# Patient Record
Sex: FEMALE | Race: ASIAN | NOT HISPANIC OR LATINO | Employment: UNEMPLOYED
[De-identification: names, ages, dates, MRNs, and addresses within clinical notes are randomized per-mention and may not be internally consistent; named-entity substitution may affect disease eponyms.]

---

## 2016-04-19 LAB
HPV_EXT - HISTORICAL: NORMAL
PAP SMEAR - HIM PATIENT REPORTED: NORMAL

## 2017-02-14 ENCOUNTER — RECORDS - HEALTHEAST (OUTPATIENT)
Dept: ADMINISTRATIVE | Facility: OTHER | Age: 42
End: 2017-02-14

## 2017-06-15 ENCOUNTER — HOSPITAL ENCOUNTER (OUTPATIENT)
Dept: MAMMOGRAPHY | Facility: HOSPITAL | Age: 42
Discharge: HOME | End: 2017-06-15
Attending: SPECIALIST

## 2017-06-15 DIAGNOSIS — Z12.31 VISIT FOR SCREENING MAMMOGRAM: ICD-10-CM

## 2018-01-30 ENCOUNTER — OFFICE VISIT - HEALTHEAST (OUTPATIENT)
Dept: FAMILY MEDICINE | Facility: CLINIC | Age: 43
End: 2018-01-30

## 2018-01-30 DIAGNOSIS — L70.9 ACNE: ICD-10-CM

## 2018-01-30 ASSESSMENT — MIFFLIN-ST. JEOR: SCORE: 958.3

## 2018-04-18 ENCOUNTER — TELEPHONE (OUTPATIENT)
Dept: OTHER | Facility: CLINIC | Age: 43
End: 2018-04-18

## 2018-04-18 NOTE — TELEPHONE ENCOUNTER
4/18/2018    Call Regarding Onboarding [facility] Other    Attempt 1    Message voicemail not set-up    Comments: 2 adult dep, 1 child dep      Outreach   VILMA

## 2018-05-24 ENCOUNTER — COMMUNICATION - HEALTHEAST (OUTPATIENT)
Dept: FAMILY MEDICINE | Facility: CLINIC | Age: 43
End: 2018-05-24

## 2018-05-24 DIAGNOSIS — L70.9 ACNE: ICD-10-CM

## 2018-05-25 RX ORDER — MINOCYCLINE HYDROCHLORIDE 100 MG/1
CAPSULE ORAL
Qty: 90 CAPSULE | Refills: 0 | Status: SHIPPED | OUTPATIENT
Start: 2018-05-25 | End: 2022-05-19

## 2019-05-14 ENCOUNTER — RECORDS - HEALTHEAST (OUTPATIENT)
Dept: ADMINISTRATIVE | Facility: OTHER | Age: 44
End: 2019-05-14

## 2019-05-30 ENCOUNTER — OFFICE VISIT - HEALTHEAST (OUTPATIENT)
Dept: FAMILY MEDICINE | Facility: CLINIC | Age: 44
End: 2019-05-30

## 2019-05-30 DIAGNOSIS — Z30.9 ENCOUNTER FOR CONTRACEPTIVE MANAGEMENT, UNSPECIFIED TYPE: ICD-10-CM

## 2019-05-30 DIAGNOSIS — L70.9 ACNE, UNSPECIFIED ACNE TYPE: ICD-10-CM

## 2019-05-30 LAB
ALBUMIN SERPL-MCNC: 3.4 G/DL (ref 3.5–5)
ALP SERPL-CCNC: 57 U/L (ref 45–120)
ALT SERPL W P-5'-P-CCNC: 16 U/L (ref 0–45)
AST SERPL W P-5'-P-CCNC: 15 U/L (ref 0–40)
BASOPHILS # BLD AUTO: 0 THOU/UL (ref 0–0.2)
BASOPHILS NFR BLD AUTO: 0 % (ref 0–2)
BILIRUB DIRECT SERPL-MCNC: 0.2 MG/DL
BILIRUB SERPL-MCNC: 0.5 MG/DL (ref 0–1)
CHOLEST SERPL-MCNC: 176 MG/DL
EOSINOPHIL # BLD AUTO: 0.2 THOU/UL (ref 0–0.4)
EOSINOPHIL NFR BLD AUTO: 3 % (ref 0–6)
ERYTHROCYTE [DISTWIDTH] IN BLOOD BY AUTOMATED COUNT: 15.5 % (ref 11–14.5)
FASTING STATUS PATIENT QL REPORTED: NO
HCT VFR BLD AUTO: 33.5 % (ref 35–47)
HDLC SERPL-MCNC: 49 MG/DL
HGB BLD-MCNC: 11.3 G/DL (ref 12–16)
LDLC SERPL CALC-MCNC: 104 MG/DL
LYMPHOCYTES # BLD AUTO: 1.9 THOU/UL (ref 0.8–4.4)
LYMPHOCYTES NFR BLD AUTO: 31 % (ref 20–40)
MCH RBC QN AUTO: 26.3 PG (ref 27–34)
MCHC RBC AUTO-ENTMCNC: 33.6 G/DL (ref 32–36)
MCV RBC AUTO: 78 FL (ref 80–100)
MONOCYTES # BLD AUTO: 0.4 THOU/UL (ref 0–0.9)
MONOCYTES NFR BLD AUTO: 6 % (ref 2–10)
NEUTROPHILS # BLD AUTO: 3.7 THOU/UL (ref 2–7.7)
NEUTROPHILS NFR BLD AUTO: 60 % (ref 50–70)
PLATELET # BLD AUTO: 222 THOU/UL (ref 140–440)
PMV BLD AUTO: 8.8 FL (ref 7–10)
PROT SERPL-MCNC: 6.5 G/DL (ref 6–8)
RBC # BLD AUTO: 4.28 MILL/UL (ref 3.8–5.4)
TRIGL SERPL-MCNC: 117 MG/DL
WBC: 6.2 THOU/UL (ref 4–11)

## 2019-05-30 RX ORDER — DOXYCYCLINE 100 MG/1
CAPSULE ORAL
Refills: 0 | Status: SHIPPED | COMMUNITY
Start: 2019-05-14 | End: 2022-05-19

## 2019-05-30 RX ORDER — LEVONORGESTREL/ETHIN.ESTRADIOL 0.1-0.02MG
1 TABLET ORAL DAILY
Qty: 90 TABLET | Refills: 3 | Status: SHIPPED | OUTPATIENT
Start: 2019-05-30 | End: 2022-05-19

## 2019-06-25 ENCOUNTER — HOSPITAL ENCOUNTER (OUTPATIENT)
Dept: MAMMOGRAPHY | Facility: CLINIC | Age: 44
Discharge: HOME | End: 2019-06-25
Attending: FAMILY MEDICINE

## 2019-06-25 DIAGNOSIS — Z12.31 VISIT FOR SCREENING MAMMOGRAM: ICD-10-CM

## 2019-07-03 ENCOUNTER — COMMUNICATION - HEALTHEAST (OUTPATIENT)
Dept: FAMILY MEDICINE | Facility: CLINIC | Age: 44
End: 2019-07-03

## 2020-03-22 ENCOUNTER — VIRTUAL VISIT (OUTPATIENT)
Dept: FAMILY MEDICINE | Facility: OTHER | Age: 45
End: 2020-03-22

## 2020-03-23 NOTE — PROGRESS NOTES
"Date: 2020 18:03:10  Clinician: Myesha Frederick  Clinician NPI: [id number]  Patient: Camilla Johnson  Patient : 1975  Patient Address: [address], [place] [address]  Patient Phone: [phone]  Visit Protocol: TAPAN  Patient Summary:  Camilla is a 44 year old ( : 1975 ) female who initiated a Visit for COVID-19 (Coronavirus) evaluation and screening. When asked the question \"Please sign me up to receive news, health information and promotions. \", Camilla responded \"No\".    Camilla states her symptoms started 1-2 days ago.   Her symptoms consist of a headache. She is experiencing mild difficulty breathing with activities but can speak normally in full sentences.   Symptom details   Headache: She states the headache is mild (1-3 on a 10 point pain scale).    Camilla denies having ear pain, rhinitis, facial pain or pressure, myalgias, wheezing, sore throat, cough, nasal congestion, malaise, chills, teeth pain, and fever. She also denies taking antibiotic medication for the symptoms and having recent facial or sinus surgery in the past 60 days.    Pertinent COVID-19 (Coronavirus) information  Camilla has not traveled internationally or to the areas where COVID-19 (Coronavirus) is widespread, including cruise ship travel in the last 14 days before the start of her symptoms.   Camilla has not had a close contact with a laboratory-confirmed COVID-19 patient within 14 days of symptom onset. She also has not had a close contact with a suspected COVID-19 patient within 14 days of symptom onset.   Camilla is not a healthcare worker and does not work in a healthcare facility.   Pertinent medical history  Camilla does not get yeast infections when she takes antibiotics.   Camilla needs a return to work/school note.   Weight: 105 lbs   Camilla does not smoke or use smokeless tobacco.   She denies pregnancy and denies breastfeeding. She is currently menstruating.   Weight: 105 lbs    MEDICATIONS: No current medications, ALLERGIES: NKDA  Clinician " Response:  Dear Camilla,   Based on the information you have provided, you do have symptoms that are consistent with Coronavirus (COVID-19).  The coronavirus causes mild to severe respiratory illness with the most common symptoms including fever, cough and difficulty breathing. Unfortunately, many viruses cause similar symptoms and it can be difficult to distinguish between viruses, especially in mild cases, so we are presuming that anyone with cough or fever has coronavirus at this time.  Coronavirus/COVID-19 has reached the point of community spread in [place], meaning that we are finding the virus in people with no known exposure risk for chen the virus. Given the increasing commonness of coronavirus in the community we are no longer testing patients who are not critically ill.  If you are a health care worker, you should refer to your employee health office for instructions about testing and returning to work.  For everyone else who has cough or fever, you should assume you are infected with coronavirus. Since you will not be tested but have symptoms that may be consistent with coronavirus, the CDC recommends you stay in self-isolation until these three things have happened:    You have had no fever for at least 72 hours (that is three full days of no fever without the use of medicine that reduces fevers)    AND   Other symptoms have improved (for example, when your cough or shortness of breath have improved)   AND   At least 7 days have passed since your symptoms first appeared.   How to Isolate:   Isolate yourself at home.  Do Not allow any visitors  Do Not go to work or school  Do Not go to [facility],  centers, shopping, or other public places.  Do Not shake hands.  Avoid close contact with others (hugging, kissing).   Protect Others:   Cover Your Mouth and Nose with a mask, disposable tissue or wash cloth to avoid spreading germs to others.  Wash your hands and face frequently with soap and  water.   We know it can be scary to hear that you might have COVID-19. Our team can help track your symptoms and make sure you are doing ok over the next two weeks using a program called [employer] to keep in touch. When you receive an email from [employer], please consider enrolling in our monitoring program. There is no cost to you for monitoring. Here is a URL where you can learn more: [email or web address]  Managing Symptoms:   At this time, we primarily recommend Tylenol (Acetaminophen) for fever or pain. If you have liver or kidney problems, contact your primary care provider for instructions on use of tylenol. Adults can take 650 mg (two 325 mg pills) by mouth every 4-6 hours as needed OR 1,000 mg (two 500 mg pills) every 8 hours as needed. MAXIMUM DAILY DOSE: 3,000mg. For children, refer to dosing on bottle based on age or weight.   If you develop significant shortness of breath that prevents you from doing normal activities, please call 911 or proceed to the nearest emergency room and alert them immediately that you have been in self-isolation for possible coronavirus.  For more information about COVID19 and options for caring for yourself at home, please visit the CDC website at https://www.cdc.gov/coronavirus/2019-ncov/about/steps-when-sick.htmlFor more options for care at [facility], please visit our website at [email or web address]    COVID-19 (Coronavirus) General Information  With the increase in the number of COVID-19 (Coronavirus) cases, we understand you may have some questions. Below is some helpful information on COVID-19 (Coronavirus).  How can I protect myself and others from the COVID-19 (Coronavirus)?  Because there is currently no vaccine to prevent infection, the best way to protect yourself is to avoid being exposed to this virus. Put distance between yourself and other people if COVID-19 (Coronavirus) is spreading in your community. The virus  is thought to spread mainly from person-to-person.     Between people who are in close contact with one another (within about 6 about) for a prolonged period (10 minutes or longer).    Through respiratory droplets produced when an infected person coughs or sneezes.     The CDC recommends the following additional steps to protect yourself and others:     Wash your hands often with soap and water for at least 20 seconds, especially after blowing your nose, coughing, or sneezing; going to the bathroom; and before eating or preparing food.  Use an alcohol-based hand  that contains at least 60 percent alcohol if soap and water are not available.        Avoid touching your eyes, nose and mouth with unwashed hands.    Avoid close contact with people who are sick.    Stay home when you are sick.    Cover your cough or sneeze with a tissue, then throw the tissue in the trash.    Clean and disinfect frequently touched objects and surfaces.     You can help stop COVID-19 (Coronavirus) by knowing the signs and symptoms:     Fever    Cough    Shortness of breath     Contact your healthcare provider if   Develop symptoms   AND   Have been in close contact with a person known to have COVID-19 (Coronavirus) or live in or have recently traveled from an area with ongoing spread of COVID-19 (Coronavirus). Call ahead before you go to a doctor's office or emergency room. Tell them about your recent travel and your symptoms.   For the most up to date information, visit the CDC's website.  Self-monitoring  Self-monitoring means people should monitor themselves for fever by taking their temperatures twice a day and remain alert for a cough or difficulty breathing.  It is important to check your health two times each day for 14 days after a potential exposure to a person with COVID-19 (Coronavirus) or after travel from a location where COVID-19 (Coronavirus) is widespread. If you have been exposed to a person with COVID-19  (Coronavirus), it may take up to 14 days to know if you will get sick. Follow the steps below to check and record your health.     Take your temperature with a thermometer twice a day, once in the morning and once in the evening, and watch for a cough or difficulty breathing for 14 days.    Write down your temperature and any COVID-19 symptoms you may have: feeling feverish, coughing, or difficulty breathing.    Stay home from work or school.    Do not take public transportation, taxis, or ride-shares.    Avoid crowded places (such as shopping centers and movie theaters) and limit your activities in public.    Keep your distance from others (about 6 feet or 2 meters).    If you get sick with fever, cough, or trouble breathing, contact your healthcare provider and tell them about your recent travel and/or your symptoms.    If you need to seek medical care for other reasons, such as dialysis, call ahead to your doctor and tell them about your recent travel.     Steps to help prevent the spread of COVID-19 (Coronavirus) if you are sick  If you are sick with COVID-19 (Coronavirus) or suspect you are infected with the virus that causes COVID-19 (Coronavirus), follow the steps below to help prevent the disease from spreading&nbsp;to people in your home and community.     Stay home except to get medical care. Home isolation may be started in consultation with your healthcare clinician.    Separate yourself from other people and animals in your home.    Call ahead before visiting your doctor if you have a medical appointment.    Wear a facemask when you are around other people.    Cover your cough and sneezes.    Clean your hands often.    Avoid sharing personal household items.    Clean and disinfect frequently touched objects and surfaces everyday.    You will need to have someone drop off medications or household supplies (if needed) at your house without coming inside or in contact with you or others living in your  "house.    Monitor your symptoms and seek prompt medical care if your illness is worsening (e.g. Difficulty breathing).    Discontinue home isolation only in consultation with your healthcare provider.     For more detailed and up to date information on what to do if you are sick, visit this link: What to Do If You Are Sick With Coronavirus Disease 2019 (COVID-19).  Do I need to be tested for COVID-19 (Coronavirus)?     At this time, the limited number of available tests are controlled by the state and local health departments and are being reserved for more seriously ill patients, those with known exposure to confirmed patients, and those with recent travel (within 14 days) to countries with high rates of COVID-19 (Coronavirus).    Decisions on which patients receive testing will be based on the local spread of COVID-19 (Coronavirus) as well as the symptoms. Your healthcare provider will make the final decision on whether you should be tested.    In the meantime, if you have concerns that you may have been exposed, it is reasonable to practice \"social distancing.\"&nbsp; If you are ill with a cold or flu-like illness, please monitor your symptoms and reach out to your healthcare provider if your symptoms worsen.    For more up to date information, visit this link: COVID-19 (Coronavirus) Frequently Asked Questions and Answers.      Diagnosis: Cough  Diagnosis ICD: R05  "

## 2020-06-25 ENCOUNTER — RECORDS - HEALTHEAST (OUTPATIENT)
Dept: HEALTH INFORMATION MANAGEMENT | Facility: CLINIC | Age: 45
End: 2020-06-25

## 2021-05-29 NOTE — PROGRESS NOTES
Subjective:      Camilla Johnson is a 44 y.o. female who presents for evaluation of planning for Accutane treatment.  She has had bad acne for many years.  She is tried and failed several treatment options.  She is about to take oral Accutane.  Her dermatologist reviewed that she would need to use 2 forms of contraception, and also get screening lab work, prior to starting medication.  Patient says she and her  have not used any contraception for years, other than natural family planning.  She feels like this is overall working well for them.  She is pretty sure she would not want to have anymore children.    Patient Active Problem List   Diagnosis     Acne       Current Outpatient Medications:      doxycycline (MONODOX) 100 MG capsule, TAKE 1 CAPSULE BY MOUTH TWICE A DAY FOR 1 MONTH. TAKE WITH FOOD/WATER, PROBIOTIC, NO DAIRY, Disp: , Rfl: 0     levonorgestrel-ethinyl estradiol (LUTERA, 28,) 0.1-20 mg-mcg per tablet, Take 1 tablet by mouth daily., Disp: 90 tablet, Rfl: 3     minocycline (MINOCIN,DYNACIN) 100 MG capsule, TAKE 1 CAPSULE (100 MG TOTAL) BY MOUTH DAILY., Disp: 90 capsule, Rfl: 0     Objective:     Allergies:  Patient has no known allergies.    Vitals:  Vitals:    05/30/19 1527   BP: 98/60   Pulse: 72   Resp: 16     Body mass index is 24.61 kg/m .    Vital signs reviewed.  General: Patient is alert and oriented x 3, in no apparent distress  Cardiac: regular rate and rhythm, no murmurs  Pulmonary: lungs clear to auscultation bilaterally      Results for orders placed or performed in visit on 05/30/19   HM1 (CBC with Diff)   Result Value Ref Range    WBC 6.2 4.0 - 11.0 thou/uL    RBC 4.28 3.80 - 5.40 mill/uL    Hemoglobin 11.3 (L) 12.0 - 16.0 g/dL    Hematocrit 33.5 (L) 35.0 - 47.0 %    MCV 78 (L) 80 - 100 fL    MCH 26.3 (L) 27.0 - 34.0 pg    MCHC 33.6 32.0 - 36.0 g/dL    RDW 15.5 (H) 11.0 - 14.5 %    Platelets 222 140 - 440 thou/uL    MPV 8.8 7.0 - 10.0 fL    Neutrophils % 60 50 - 70 %    Lymphocytes %  31 20 - 40 %    Monocytes % 6 2 - 10 %    Eosinophils % 3 0 - 6 %    Basophils % 0 0 - 2 %    Neutrophils Absolute 3.7 2.0 - 7.7 thou/uL    Lymphocytes Absolute 1.9 0.8 - 4.4 thou/uL    Monocytes Absolute 0.4 0.0 - 0.9 thou/uL    Eosinophils Absolute 0.2 0.0 - 0.4 thou/uL    Basophils Absolute 0.0 0.0 - 0.2 thou/uL   Other labs pending.    Assessment and Plan:   1.  Contraception management.  We reviewed birth control options.  After discussion of all options she would like to try birth control pills.  She has used these in the past.  She says she will remember to take them every day.  She is a non-smoker, no history of hypertension.  We reviewed risks, benefits, possible side effects of birth control pills.  Reviewed that she needs to use back-up birth control for the first 2 weeks of use.  Dermatologist also wanted her to use a secondary form of birth control: condoms, diaphragm, cervical, spermicide.  She will choose condoms.    Follow-up as needed.    2.  Acne, planning Accutane treatment.  Contraception reviewed, see #1.  Provider also had screening labs that he wanted drawn.  Labs ordered today.  Will inform patient and provider of results when available.    This dictation uses voice recognition software, which may contain typographical errors.

## 2021-05-31 VITALS — BODY MASS INDEX: 24.65 KG/M2 | HEIGHT: 55 IN | WEIGHT: 106.5 LBS

## 2021-06-02 ENCOUNTER — RECORDS - HEALTHEAST (OUTPATIENT)
Dept: ADMINISTRATIVE | Facility: CLINIC | Age: 46
End: 2021-06-02

## 2021-06-03 VITALS — WEIGHT: 103 LBS | BODY MASS INDEX: 24.61 KG/M2

## 2021-06-16 PROBLEM — Z30.9 ENCOUNTER FOR CONTRACEPTIVE MANAGEMENT, UNSPECIFIED TYPE: Status: ACTIVE | Noted: 2019-05-30

## 2021-06-16 PROBLEM — L70.9 ACNE: Status: ACTIVE | Noted: 2018-01-30

## 2021-06-16 NOTE — PROGRESS NOTES
ASSESSMENT/PLAN:  1. Acne  minocycline (MINOCIN,DYNACIN) 100 MG capsule       This is a 41 yo female - new patient to our clinic today.  She is here to establish care.  We have reviewed medical history - see EHR.  She has been reasonably healthy until this time.  She does have some acne and has benefitted in the past from oral antibiotic therapy.  We will refill her minocycline therapy          Medications Discontinued During This Encounter   Medication Reason     doxycycline (MONODOX) 100 MG capsule Therapy completed     minocycline (MINOCIN,DYNACIN) 50 MG capsule      triamcinolone (KENALOG) 0.1 % cream Therapy completed     There are no Patient Instructions on file for this visit.    Chief Complaint:  Chief Complaint   Patient presents with     Establish Care       HPI:   Camilla Johnson is a 42 y.o. female c/o  New patient   Wants to establish care  No specific concerns  Wants refill on acne medicine    PMH:   Patient Active Problem List    Diagnosis Date Noted     Acne 2018     Past Medical History:   Diagnosis Date     Inverted nipple     Has had for a while     Past Surgical History:   Procedure Laterality Date     BREAST EXCISIONAL BIOPSY Left 2013    benign     BREAST EXCISIONAL BIOPSY Left 2012      SECTION      x2     Social History     Social History     Marital status:      Spouse name: N/A     Number of children: N/A     Years of education: N/A     Occupational History      - [employer]      Social History Main Topics     Smoking status: Never Smoker     Smokeless tobacco: Never Used     Alcohol use No     Drug use: No     Sexual activity: Yes     Partners: Male     Other Topics Concern     Not on file     Social History Narrative    Lives with , 6 children    Born in [facility], to  1985       Meds:    Current Outpatient Prescriptions:      minocycline (MINOCIN,DYNACIN) 100 MG capsule, Take 1 capsule (100 mg total) by mouth daily., Disp: 90 capsule, Rfl:  "0    Allergies:  No Known Allergies    ROS:  Pertinent positives as noted in HPI; otherwise 12 point ROS negative.      Physical Exam:  EXAM:  /80 (Patient Site: Left Arm, Patient Position: Sitting, Cuff Size: Adult Small)  Pulse 71  Temp 97.9  F (36.6  C) (Oral)   Resp 14  Ht 4' 6.25\" (1.378 m)  Wt 106 lb 8 oz (48.3 kg)  LMP 01/18/2018 (Exact Date)  SpO2 98%  BMI 25.44 kg/m2   Gen:  NAD, appears well, well-hydrated  HEENT:  TMs nl, oropharynx benign, nasal mucosa nl, conjunctiva clear  Neck:  Supple, no adenopathy, no thyromegaly, no carotid bruits, no JVD  Lungs:  Clear to auscultation bilaterally  Cor:  RRR no murmur  Abd:  Soft, nontender, BS+, no masses, no guarding or rebound, no HSM  Extr:  Neg.  Neuro:  No asymmetry  Skin:  Warm/dry, facial acne with inflamed papules, small pustules        Results:  No results found for this or any previous visit.          "

## 2021-06-19 NOTE — LETTER
Letter by Connie Ibarra MD at      Author: Connie Ibarra MD Service: -- Author Type: --    Filed:  Encounter Date: 7/3/2019 Status: (Other)         Camilla Johnson  [address] [place] [address]             July 3, 2019         Dear Ms. Alex,    Below are the results from your recent visit:    Resulted Orders   Mammo Screening Bilateral    Narrative    BILATERAL FULL FIELD DIGITAL SCREENING MAMMOGRAM WITH TOMOSYNTHESIS    Performed on: 6/25/19      Compared to: 06/15/2017 Mammo Screening Bilateral, 06/13/2016 Mammo   Screening Bilateral, and 05/05/2015 Mammo Screening Bilateral    Findings: The breasts are heterogeneously dense, which may obscure small   masses. There is no radiographic evidence of malignancy. This study was   evaluated with the assistance of Computer-Aided Detection. Breast   Tomosynthesis was used in interpretation. Repeat routine screening   mammogram in one year is recommended.    ACR BI-RADS Category 1: Negative       Your mammogram is normal.     Please call with questions or contact us using [employer].    Sincerely,        Electronically signed by Connie Ibarra MD

## 2022-05-04 ENCOUNTER — ANCILLARY PROCEDURE (OUTPATIENT)
Dept: MAMMOGRAPHY | Facility: CLINIC | Age: 47
End: 2022-05-04
Attending: FAMILY MEDICINE
Payer: COMMERCIAL

## 2022-05-04 DIAGNOSIS — Z12.31 VISIT FOR SCREENING MAMMOGRAM: ICD-10-CM

## 2022-05-04 PROCEDURE — 77067 SCR MAMMO BI INCL CAD: CPT

## 2022-05-05 ENCOUNTER — ANCILLARY PROCEDURE (OUTPATIENT)
Dept: MAMMOGRAPHY | Facility: CLINIC | Age: 47
End: 2022-05-05
Attending: FAMILY MEDICINE
Payer: COMMERCIAL

## 2022-05-05 DIAGNOSIS — N64.89 BREAST ASYMMETRY: ICD-10-CM

## 2022-05-05 PROCEDURE — 76642 ULTRASOUND BREAST LIMITED: CPT | Mod: LT

## 2022-05-09 ENCOUNTER — ANCILLARY PROCEDURE (OUTPATIENT)
Dept: MAMMOGRAPHY | Facility: CLINIC | Age: 47
End: 2022-05-09
Attending: FAMILY MEDICINE
Payer: COMMERCIAL

## 2022-05-09 DIAGNOSIS — N63.20 BREAST MASS, LEFT: ICD-10-CM

## 2022-05-09 PROCEDURE — 272N000431 US BREAST BIOPSY CORE NEEDLE LEFT

## 2022-05-09 PROCEDURE — 88305 TISSUE EXAM BY PATHOLOGIST: CPT | Mod: TC | Performed by: FAMILY MEDICINE

## 2022-05-09 PROCEDURE — 999N000065 MA POST PROCEDURE LEFT

## 2022-05-09 PROCEDURE — 250N000009 HC RX 250: Performed by: FAMILY MEDICINE

## 2022-05-09 PROCEDURE — 88305 TISSUE EXAM BY PATHOLOGIST: CPT | Mod: 26 | Performed by: PATHOLOGY

## 2022-05-09 RX ADMIN — LIDOCAINE HYDROCHLORIDE 10 ML: 10 INJECTION, SOLUTION EPIDURAL; INFILTRATION; INTRACAUDAL; PERINEURAL at 08:57

## 2022-05-09 NOTE — NURSING NOTE
9:00 am- Camilla is here for an U/S guided core biopsy. She denied needing interpretive services. She identified herself using her full name and . We reviewed the procedure she is here for, in her own words and indicated the procedure was for the left breast. She was given the consent, signed it with a witness. I then explained that I would escort her to the changing room and I would come back and get her when finished. I then escorted her to U/S room 1 and assisted her to supine position. U/S staff aware of her location. CHON Samuel, OCN, CBCN

## 2022-05-10 ENCOUNTER — TELEPHONE (OUTPATIENT)
Dept: MAMMOGRAPHY | Facility: CLINIC | Age: 47
End: 2022-05-10
Payer: COMMERCIAL

## 2022-05-10 LAB
PATH REPORT.COMMENTS IMP SPEC: NORMAL
PATH REPORT.FINAL DX SPEC: NORMAL
PATH REPORT.GROSS SPEC: NORMAL
PATH REPORT.MICROSCOPIC SPEC OTHER STN: NORMAL
PATH REPORT.RELEVANT HX SPEC: NORMAL
PHOTO IMAGE: NORMAL

## 2022-05-10 NOTE — TELEPHONE ENCOUNTER
At the request of the Breast Center Radiologist, Dr. Bishop,  I phoned patient and discussed the following benign breast biopsy results:    BREAST, LEFT, 2:00, 2 CM FROM NIPPLE, MASS, ULTRASOUND-GUIDED NEEDLE CORE BIOPSY:  -INTRADUCTAL PAPILLOMA AND COMPLEX SCLEROSING LESION  -SEE COMMENT    Per Dr. Bishop I have scheduled patient for the following:    Breast Surgeon Consult  Dr. Peck  5/19/22 @ 10:20   [address], [address]   [place] [address] [phone]    Patient denies any concerns at her biopsy site. Questions were answered and my phone number given if she has further questions or concerns.  Ordering provider was informed of these results.  Patient verbalized understanding and agrees with the plan of care.     Francine Gordon, RN, BSN, PHN, CBCN  Breast Center Imaging Nurse Coordinator   [address]  [place] [address] [phone]

## 2022-05-19 ENCOUNTER — OFFICE VISIT (OUTPATIENT)
Dept: SURGERY | Facility: CLINIC | Age: 47
End: 2022-05-19
Attending: PHYSICIAN ASSISTANT
Payer: COMMERCIAL

## 2022-05-19 ENCOUNTER — TELEPHONE (OUTPATIENT)
Dept: SURGERY | Facility: CLINIC | Age: 47
End: 2022-05-19

## 2022-05-19 VITALS — HEIGHT: 55 IN | WEIGHT: 106 LBS | RESPIRATION RATE: 16 BRPM | BODY MASS INDEX: 24.53 KG/M2

## 2022-05-19 DIAGNOSIS — Z11.59 ENCOUNTER FOR SCREENING FOR OTHER VIRAL DISEASES: Primary | ICD-10-CM

## 2022-05-19 DIAGNOSIS — D24.2 PAPILLOMA OF LEFT BREAST: Primary | ICD-10-CM

## 2022-05-19 PROCEDURE — G0463 HOSPITAL OUTPT CLINIC VISIT: HCPCS

## 2022-05-19 PROCEDURE — 99203 OFFICE O/P NEW LOW 30 MIN: CPT | Performed by: SPECIALIST

## 2022-05-19 NOTE — NURSING NOTE
"Camilla presents to [facility] today for a surgical consult with Dr. Peck  regarding a left breast lesion. Of note, patient said this is her 3rd papillary lesion, having seen Dr. Peck two times before for removal.   RN assessment and EMR update.  Resp 16   Ht 1.372 m (4' 6\")   Wt 48.1 kg (106 lb)   Breastfeeding No   BMI 25.56 kg/m   Patient met with Dr. Peck .  See dictation for details of visit. She will plan wire loc surgical excision of lesion.  Pre and post op teaching, written and verbal, provided to patient.  Walked her to [facility] for surgery scheduling.  Follow up pending biopsy results.  RN time 22 mins.  "

## 2022-05-19 NOTE — TELEPHONE ENCOUNTER
Spoke with Camilla today regarding surgery scheduling     Patient was informed of the followin. Patient has been informed to consult their PCP/Cardiologist about stopping their blood thinners about a week before surgery. No thinner, oxygen or devices  2. Pre Op Physical will need to be done by  Surgeon see below  3. Required Covid Test with in 4 days prior to surgery. (See Date Below)  4. Ride required after surgery, can not use [employer] or public transportation.    Patient was informed that failure to do so may result in cancellation of surgery    We've received instruction to get you scheduled for surgery with Dr Chandler. We have that arranged as follows:     Surgery Date: 2022  Location: [facility] at 8:45 a.m. then you will check in at the [facility] for your procedure. ([id number])  Arrival Time: 8:45 a.m. (Unless instructed differently by the pre-op call nurse)     Post op Appointment: 6/3/2022 at 9:40 a.m. with Dr. Peck in [facility] Instructions:     1. Dr. Alvarado will do your pre-op physical the day of surgery. This may be virtual or face-to-face depending on your doctors preference. Call them right away to schedule this.    2. PCR-Rated COVID19 testing is required within 4 days of surgery. We have this scheduled for you at [facility] on 1st floor, [address] [place] [address] on 2022 at 8:50 a.m. Follow the specific instructions you receive by Teddy. If your test is positive, your surgery will be canceled.     3. Nothing to eat or drink for 8 hours before surgery unless instructed differently by the pre-op nurse.    4. If the community sees a new COVID19 surge, your procedure may need to be postponed. We will contact you if this happens.     5. You will need an adult to drive you home and stay with you 24 hours after surgery. Public transportation or Medical Van Services are not permitted.    6. You may have one family member wait in the lobby at  the surgery center during your surgery. Visitor restrictions are subject to change, please verify with the pre-op nurse when they call.    7. You will be screened for high-risk exposure to Covid-19 during the pre-op call.  We encourage you to quarantine yourself away from any Covid-19 people for 10 days before surgery to avoid possible last minute cancellations.   When you arrive to the surgery center, you will again be screened for COVID19 symptoms. If you screen positive, your surgery will need to be postponed.    8. We always encourage you to notify your insurance any time you have medical tests or procedures scheduled including surgery. The number is usually right on the back of your insurance card. Please call [facility] Cost of Care at [phone] for the surgeon fees, and [facility] Cost of Care [phone] for facility fees if you need pricing information.     9. You will receive a call from a pre-op call nurse 1-3 days prior to surgery. She will go over more details with you.     Call our office if you have any questions! Thank you!       Patient was in agreement with this plan    Surgery Letter sent via mail  And one left for her to  at her Covid test.

## 2022-05-19 NOTE — H&P (VIEW-ONLY)
This is a 47 year old woman who I'm asked to see by Camilla Hope for evaluation of  left breast lesion.  This was a density picked up on mammogram.  She underwent a needle biopsy which shows a papillary lesion and a focus of a complex sclerosing lesion.  She has no palpable mass that she can feel.  She has no family history of breast cancer.  Patient herself is status post left breast biopsy for papilloma about 9 years ago.    Past Medical History:  No past medical history on file.  Past Surgical History:   Procedure Laterality Date     BREAST EXCISIONAL BIOPSY Left 2013    benign     BREAST EXCISIONAL BIOPSY Left 2012      SECTION      x2         Current Outpatient Medications:      condoms - male Misc, [CONDOMS - MALE MISC] Use as directed., Disp: 30 each, Rfl: 3     doxycycline (MONODOX) 100 MG capsule, [DOXYCYCLINE (MONODOX) 100 MG CAPSULE] TAKE 1 CAPSULE BY MOUTH TWICE A DAY FOR 1 MONTH. TAKE WITH FOOD/WATER, PROBIOTIC, NO DAIRY, Disp: , Rfl: 0     levonorgestrel-ethinyl estradiol (LUTERA, 28,) 0.1-20 mg-mcg per tablet, [LEVONORGESTREL-ETHINYL ESTRADIOL (LUTERA, 28,) 0.1-20 MG-MCG PER TABLET] Take 1 tablet by mouth daily., Disp: 90 tablet, Rfl: 3     minocycline (MINOCIN,DYNACIN) 100 MG capsule, [MINOCYCLINE (MINOCIN,DYNACIN) 100 MG CAPSULE] TAKE 1 CAPSULE (100 MG TOTAL) BY MOUTH DAILY., Disp: 90 capsule, Rfl: 0    No Known Allergies    Social History     Tobacco Use     Smoking status: Never Smoker     Smokeless tobacco: Never Used   Substance Use Topics     Alcohol use: No     Drug use: No       A comprehensive review of systems was negative.    Physical exam:  There were no vitals taken for this visit.   General: alert, appears stated age and cooperative  Lungs: Good diaphragmatic excursion. Lungs clear.     CV: regular rate and rhythm  Breast: breasts symmetric, no dominant or suspicious mass or no skin or nipple changes  Axilla: no adenopathy      Imaging: Her mammogram and ultrasound were  reviewed.    Impression: Papilloma and complex sclerosing lesion of the left  breast.  Further excision is indicated to rule out DCIS or invasive  carcinoma. Risks and benefits of surgery explained and they wish to proceed.  All questions answered.  She understands that she is at higher risk for developing breast cancer as this is her third papilloma.    Plan: Left breast biopsy with wire localization.  Typically an outpatient procedure under local MAC anesthetic.  Follow-up with me after surgery can be as needed if pathology shows anything of concern or if she has any problems with surgery.  Assuming the pathology is negative, she will still need to continue with yearly mammograms.

## 2022-05-19 NOTE — TELEPHONE ENCOUNTER
She had to leave quickly for another appt before letter was finished.      VM left for Camilla, :  8:45 arrival at the [facility] on 5/27  Letter sent in the mail and another will be ready for  at your covid test listed below.    Pre-op will be done by Dr. Peck the morning of procedure.      PCR-Rated COVID19 testing is required within 4 days of surgery. We have this scheduled for you at [facility] on 1st floor, [address] [place] [address] on 5/23/2022 at 8:50 a.m.

## 2022-05-23 ENCOUNTER — LAB (OUTPATIENT)
Dept: FAMILY MEDICINE | Facility: CLINIC | Age: 47
End: 2022-05-23
Payer: COMMERCIAL

## 2022-05-23 DIAGNOSIS — Z11.59 ENCOUNTER FOR SCREENING FOR OTHER VIRAL DISEASES: ICD-10-CM

## 2022-05-23 LAB — SARS-COV-2 RNA RESP QL NAA+PROBE: NEGATIVE

## 2022-05-23 PROCEDURE — U0005 INFEC AGEN DETEC AMPLI PROBE: HCPCS

## 2022-05-23 PROCEDURE — U0003 INFECTIOUS AGENT DETECTION BY NUCLEIC ACID (DNA OR RNA); SEVERE ACUTE RESPIRATORY SYNDROME CORONAVIRUS 2 (SARS-COV-2) (CORONAVIRUS DISEASE [COVID-19]), AMPLIFIED PROBE TECHNIQUE, MAKING USE OF HIGH THROUGHPUT TECHNOLOGIES AS DESCRIBED BY CMS-2020-01-R: HCPCS

## 2022-05-25 ENCOUNTER — TELEPHONE (OUTPATIENT)
Dept: SURGERY | Facility: CLINIC | Age: 47
End: 2022-05-25
Payer: COMMERCIAL

## 2022-05-25 NOTE — TELEPHONE ENCOUNTER
Patient called, asking for the results of her pre procedure covid test.  Told her results are Negative.  Asked if she wished to sign up for [employer] for results and communication.  Sent her an email with sign up information.

## 2022-05-26 ENCOUNTER — ANESTHESIA EVENT (OUTPATIENT)
Dept: SURGERY | Facility: AMBULATORY SURGERY CENTER | Age: 47
End: 2022-05-26
Payer: COMMERCIAL

## 2022-05-27 ENCOUNTER — ANCILLARY PROCEDURE (OUTPATIENT)
Dept: MAMMOGRAPHY | Facility: CLINIC | Age: 47
End: 2022-05-27
Attending: SPECIALIST
Payer: COMMERCIAL

## 2022-05-27 ENCOUNTER — HOSPITAL ENCOUNTER (OUTPATIENT)
Facility: AMBULATORY SURGERY CENTER | Age: 47
Discharge: HOME | End: 2022-05-27
Attending: SPECIALIST
Payer: COMMERCIAL

## 2022-05-27 ENCOUNTER — ANESTHESIA (OUTPATIENT)
Dept: SURGERY | Facility: AMBULATORY SURGERY CENTER | Age: 47
End: 2022-05-27
Payer: COMMERCIAL

## 2022-05-27 VITALS
WEIGHT: 105 LBS | HEART RATE: 60 BPM | OXYGEN SATURATION: 100 % | SYSTOLIC BLOOD PRESSURE: 102 MMHG | HEIGHT: 55 IN | BODY MASS INDEX: 24.3 KG/M2 | TEMPERATURE: 97.3 F | DIASTOLIC BLOOD PRESSURE: 67 MMHG | RESPIRATION RATE: 16 BRPM

## 2022-05-27 DIAGNOSIS — D24.2 PAPILLOMA OF LEFT BREAST: ICD-10-CM

## 2022-05-27 PROCEDURE — 76098 X-RAY EXAM SURGICAL SPECIMEN: CPT

## 2022-05-27 PROCEDURE — 272N000431 IMAGE GUIDED BREAST LOCALIZATION LEFT

## 2022-05-27 PROCEDURE — 19125 EXCISION BREAST LESION: CPT | Mod: LT | Performed by: SPECIALIST

## 2022-05-27 PROCEDURE — 250N000009 HC RX 250: Performed by: SPECIALIST

## 2022-05-27 PROCEDURE — 999N000065 MA POST PROCEDURE LEFT

## 2022-05-27 RX ORDER — LIDOCAINE 40 MG/G
CREAM TOPICAL
Status: DISCONTINUED | OUTPATIENT
Start: 2022-05-27 | End: 2022-05-28 | Stop reason: HOSPADM

## 2022-05-27 RX ORDER — LIDOCAINE HYDROCHLORIDE 20 MG/ML
INJECTION, SOLUTION INFILTRATION; PERINEURAL PRN
Status: DISCONTINUED | OUTPATIENT
Start: 2022-05-27 | End: 2022-05-27

## 2022-05-27 RX ORDER — IBUPROFEN 600 MG/1
600 TABLET, FILM COATED ORAL
Status: CANCELLED | OUTPATIENT
Start: 2022-05-27

## 2022-05-27 RX ORDER — MEPERIDINE HYDROCHLORIDE 25 MG/ML
12.5 INJECTION INTRAMUSCULAR; INTRAVENOUS; SUBCUTANEOUS
Status: CANCELLED | OUTPATIENT
Start: 2022-05-27

## 2022-05-27 RX ORDER — FENTANYL CITRATE 0.05 MG/ML
25 INJECTION, SOLUTION INTRAMUSCULAR; INTRAVENOUS
Status: CANCELLED | OUTPATIENT
Start: 2022-05-27

## 2022-05-27 RX ORDER — HYDROCODONE BITARTRATE AND ACETAMINOPHEN 5; 325 MG/1; MG/1
1 TABLET ORAL
Status: CANCELLED | OUTPATIENT
Start: 2022-05-27

## 2022-05-27 RX ORDER — BUPIVACAINE HYDROCHLORIDE 2.5 MG/ML
INJECTION, SOLUTION INFILTRATION; PERINEURAL PRN
Status: DISCONTINUED | OUTPATIENT
Start: 2022-05-27 | End: 2022-05-27 | Stop reason: HOSPADM

## 2022-05-27 RX ORDER — OXYCODONE HYDROCHLORIDE 5 MG/1
5 TABLET ORAL EVERY 4 HOURS PRN
Status: CANCELLED | OUTPATIENT
Start: 2022-05-27

## 2022-05-27 RX ORDER — ACETAMINOPHEN 325 MG/1
975 TABLET ORAL ONCE
Status: COMPLETED | OUTPATIENT
Start: 2022-05-27 | End: 2022-05-27

## 2022-05-27 RX ORDER — ONDANSETRON 2 MG/ML
INJECTION INTRAMUSCULAR; INTRAVENOUS PRN
Status: DISCONTINUED | OUTPATIENT
Start: 2022-05-27 | End: 2022-05-27

## 2022-05-27 RX ORDER — DEXAMETHASONE SODIUM PHOSPHATE 4 MG/ML
INJECTION, SOLUTION INTRA-ARTICULAR; INTRALESIONAL; INTRAMUSCULAR; INTRAVENOUS; SOFT TISSUE PRN
Status: DISCONTINUED | OUTPATIENT
Start: 2022-05-27 | End: 2022-05-27

## 2022-05-27 RX ORDER — FENTANYL CITRATE 50 UG/ML
INJECTION, SOLUTION INTRAMUSCULAR; INTRAVENOUS PRN
Status: DISCONTINUED | OUTPATIENT
Start: 2022-05-27 | End: 2022-05-27

## 2022-05-27 RX ORDER — PROPOFOL 10 MG/ML
INJECTION, EMULSION INTRAVENOUS CONTINUOUS PRN
Status: DISCONTINUED | OUTPATIENT
Start: 2022-05-27 | End: 2022-05-27

## 2022-05-27 RX ORDER — HYDROMORPHONE HCL IN WATER/PF 6 MG/30 ML
0.2 PATIENT CONTROLLED ANALGESIA SYRINGE INTRAVENOUS EVERY 5 MIN PRN
Status: CANCELLED | OUTPATIENT
Start: 2022-05-27

## 2022-05-27 RX ORDER — SODIUM CHLORIDE, SODIUM LACTATE, POTASSIUM CHLORIDE, CALCIUM CHLORIDE 600; 310; 30; 20 MG/100ML; MG/100ML; MG/100ML; MG/100ML
INJECTION, SOLUTION INTRAVENOUS CONTINUOUS
Status: CANCELLED | OUTPATIENT
Start: 2022-05-27

## 2022-05-27 RX ORDER — ONDANSETRON 4 MG/1
4 TABLET, ORALLY DISINTEGRATING ORAL EVERY 30 MIN PRN
Status: CANCELLED | OUTPATIENT
Start: 2022-05-27

## 2022-05-27 RX ORDER — FENTANYL CITRATE 0.05 MG/ML
25 INJECTION, SOLUTION INTRAMUSCULAR; INTRAVENOUS EVERY 5 MIN PRN
Status: CANCELLED | OUTPATIENT
Start: 2022-05-27

## 2022-05-27 RX ORDER — HYDROCODONE BITARTRATE AND ACETAMINOPHEN 5; 325 MG/1; MG/1
1-2 TABLET ORAL EVERY 4 HOURS PRN
Qty: 10 TABLET | Refills: 0 | Status: SHIPPED | OUTPATIENT
Start: 2022-05-27

## 2022-05-27 RX ORDER — SODIUM CHLORIDE, SODIUM LACTATE, POTASSIUM CHLORIDE, CALCIUM CHLORIDE 600; 310; 30; 20 MG/100ML; MG/100ML; MG/100ML; MG/100ML
INJECTION, SOLUTION INTRAVENOUS CONTINUOUS
Status: DISCONTINUED | OUTPATIENT
Start: 2022-05-27 | End: 2022-05-28 | Stop reason: HOSPADM

## 2022-05-27 RX ORDER — ONDANSETRON 2 MG/ML
4 INJECTION INTRAMUSCULAR; INTRAVENOUS EVERY 30 MIN PRN
Status: CANCELLED | OUTPATIENT
Start: 2022-05-27

## 2022-05-27 RX ADMIN — FENTANYL CITRATE 50 MCG: 50 INJECTION, SOLUTION INTRAMUSCULAR; INTRAVENOUS at 11:20

## 2022-05-27 RX ADMIN — SODIUM CHLORIDE, SODIUM LACTATE, POTASSIUM CHLORIDE, CALCIUM CHLORIDE: 600; 310; 30; 20 INJECTION, SOLUTION INTRAVENOUS at 10:09

## 2022-05-27 RX ADMIN — FENTANYL CITRATE 25 MCG: 50 INJECTION, SOLUTION INTRAMUSCULAR; INTRAVENOUS at 11:31

## 2022-05-27 RX ADMIN — DEXAMETHASONE SODIUM PHOSPHATE 10 MG: 4 INJECTION, SOLUTION INTRA-ARTICULAR; INTRALESIONAL; INTRAMUSCULAR; INTRAVENOUS; SOFT TISSUE at 11:23

## 2022-05-27 RX ADMIN — ONDANSETRON 4 MG: 2 INJECTION INTRAMUSCULAR; INTRAVENOUS at 11:23

## 2022-05-27 RX ADMIN — ACETAMINOPHEN 975 MG: 325 TABLET ORAL at 10:09

## 2022-05-27 RX ADMIN — LIDOCAINE HYDROCHLORIDE 60 MG: 20 INJECTION, SOLUTION INFILTRATION; PERINEURAL at 11:20

## 2022-05-27 RX ADMIN — Medication 100 MCG: at 11:37

## 2022-05-27 RX ADMIN — LIDOCAINE HYDROCHLORIDE 10 ML: 10 INJECTION, SOLUTION EPIDURAL; INFILTRATION; INTRACAUDAL; PERINEURAL at 09:18

## 2022-05-27 RX ADMIN — FENTANYL CITRATE 25 MCG: 50 INJECTION, SOLUTION INTRAMUSCULAR; INTRAVENOUS at 11:28

## 2022-05-27 RX ADMIN — PROPOFOL 200 MCG/KG/MIN: 10 INJECTION, EMULSION INTRAVENOUS at 11:20

## 2022-05-27 NOTE — ANESTHESIA CARE TRANSFER NOTE
Patient: Camilla Johnson    Procedure: Procedure(s):  Left Breast Biopsy after Wire Localization       Diagnosis: Papilloma of left breast [D24.2]  Diagnosis Additional Information: No value filed.    Anesthesia Type:   MAC     Note:    Oropharynx: oropharynx clear of all foreign objects and spontaneously breathing  Level of Consciousness: awake  Oxygen Supplementation: room air    Independent Airway: airway patency satisfactory and stable  Dentition: dentition unchanged  Vital Signs Stable: post-procedure vital signs reviewed and stable  Report to RN Given: handoff report given  Patient transferred to: Phase II    Handoff Report: Identifed the Patient, Identified the Reponsible Provider, Reviewed the pertinent medical history, Discussed the surgical course, Reviewed Intra-OP anesthesia mangement and issues during anesthesia, Set expectations for post-procedure period and Allowed opportunity for questions and acknowledgement of understanding      Vitals:  Vitals Value Taken Time   /67 05/27/22 1200   Temp     Pulse 60 05/27/22 1202   Resp 14    SpO2 100 % 05/27/22 1202   Vitals shown include unvalidated device data.    Electronically Signed By: SCOTT Edouard CRNA  May 27, 2022  12:06 PM

## 2022-05-27 NOTE — DISCHARGE INSTRUCTIONS
If you have any questions or concerns regarding your procedure, please contact Dr. Peck, her office number is [phone].    You received 975 mg of acetaminophen (Tylenol) at 10:09 AM. Please do not take an additional dose of Tylenol until after 4:09 PM.    Do not exceed 4,000 mg of acetaminophen during a 24 hour period and keep in mind that acetaminophen can also be found in many over-the-counter cold medications as well as narcotics that may be given for pain.    Discharge Instructions for Lumpectomy or Breast Biopsy     You just had a procedure to remove a lump or a small piece of tissue from your breast. After surgery, be sure to have an adult drive you home. Ask your healthcare provider when you will get the results of the biopsy. Will they call you or will you talk about it at your next visit? Make a follow-up appointment as directed by your healthcare provider.     What to expect    These are common after a lumpectomy or breast biopsy:    Bruising and mild swelling around the incision  Mild discomfort for a few days  Feeling tired for a day or so  Feeling anxious or down    Diet    Here's what to eat and drink after a lumpectomy or breast biopsy:    Start with liquids and light, easy-to-digest foods, such as bananas and dry toast. As you feel up to it, slowly return to your normal diet.  Drink at least  6 to 8 glasses of water or other nonalcoholic fluids a day, unless directed otherwise.    Activity    What you can do after a lumpectomy or breast biopsy:   After the procedure, take it easy for the rest of the day.  If you had general anesthesia, don't use machinery or power tools, drink alcohol, or make any major decisions for at least the first 24 hours.  Ask your healthcare providers how you should care for the dressing and when you can take it off.  You may shower and pat the cut (incision) dry, but don't soak in a tub until you see the healthcare provider again.   Return to normal activities  (including driving) after 24 hours.     Bandage and incision care    To care for your incision site:  Take pain medicines as directed. Don t wait until the pain gets bad before taking them. Don t drink alcohol while on pain medicines.  Place an ice pack over the bandaged incision for no longer than 20 minutes at a time. Do this as instructed by your healthcare provider. To make an ice pack, put ice cubes in a plastic bag that seals at the top. Wrap the bag in a clean, thin towel or cloth. Never put ice or an ice pack directly on your skin.  Wear a comfortable, snug-fitting bra at all times, even to bed. This helps keep swelling down.  If your incision has been closed with glue, don't apply lotion, ointment, or cream to the area. Doing so can cause the glue to dissolve.  If strips of tape have been used to close your incision, don't pull them off. Let them fall off on their own.  If you have a gauze bandage, keep it and the wound dry for 48 hours. If the gauze bandage gets wet, replace it with a clean, dry bandage.    When to call your healthcare provider    Call your healthcare provider right away if you have any of these:   Vomiting or nausea that does not go away  Fever of 101.5  F (38.6 C) or higher, or chills  Fluid leaking from the incision that smells bad  Pain not relieved by pain medicines  Bleeding, warmth, redness, or hard swelling around the incision  Chest pain or shortness of breath  Trouble urinating, blood in urine, pain when urinating, or urine that's cloudy or smells bad    Coping with pain    *Pain after surgery is normal and expected*    If you have pain after surgery, pain medicine will help you feel better. Take it as told, before pain becomes severe. Also, ask your healthcare provider or pharmacist about other ways to control pain. This might be with heat, ice, or relaxation. And follow any other instructions your surgeon or nurse gives you.    Tips for taking pain medicine    To get the best  relief possible, remember these points:    Pain medicines can upset your stomach. Taking them with a little food may help.  Most pain relievers taken by mouth need at least 20 to 30 minutes to start to work.  Don't wait till your pain becomes severe before you take your medicine. Try to time your medicine so that you can take it before starting an activity. This might be before you get dressed, go for a walk, or sit down for dinner.  Constipation is a common side effect of pain medicines. You can take medicines such as laxatives (Miralax) or stool softeners to help ease constipation. Drinking lots of fluids and eating foods such as fruits and vegetables that are high in fiber can also help.  Drinking alcohol and taking pain medicine can cause dizziness and slow your breathing. It can even be deadly. Don't drink alcohol while taking pain medicine.  Pain medicine can make you react more slowly to things. Don't drive or run machinery while taking pain medicine.  Your healthcare provider may tell you to take acetaminophen to help ease your pain. Ask him or her how much you are supposed to take each day. Acetaminophen or other pain relievers may interact with your prescription medicines or other over-the-counter (OTC) medicines. Some prescription medicines have acetaminophen and other ingredients. Using both prescription and OTC acetaminophen for pain can cause you to overdose. Read the labels on your OTC medicines with care. This will help you to clearly know the list of ingredients, how much to take, and any warnings. It may also help you not take too much acetaminophen. If you have questions or don't understand the information, ask your pharmacist or healthcare provider to explain it to you before you take the OTC medicine.    Discharge Instructions: After Your Surgery, regarding Anesthesia    You ve just had surgery. During surgery, you were given medicine called anesthesia to keep you relaxed and free of pain. After  surgery, you may have some pain or nausea. This is common. Here are some tips for feeling better and getting well after surgery.    Going home    Your healthcare provider will show you how to take care of yourself when you go home. He or she will also answer your questions. Have an adult family member or friend drive you home. For the first 24 hours after your surgery:    Don't drive or use heavy equipment.  Don't make important decisions or sign legal papers.  Don't drink alcohol.  Have someone stay with you for the next 24 hours. He or she can watch for problems and help keep you safe.  Be sure to go to all follow-up visits with your healthcare provider. And rest after your surgery for as long as your healthcare provider tells you to.    Managing nausea    Some people have an upset stomach after surgery. This is often because of anesthesia, pain, or pain medicine, or the stress of surgery. These tips will help you handle nausea and eat healthy foods as you get better. If you were on a special food plan before surgery, ask your healthcare provider if you should follow it while you get better. These tips may help:    Don't push yourself to eat. Your body will tell you when to eat and how much.  Start off with clear liquids and soup. They are easier to digest.  Next try semi-solid foods, such as mashed potatoes, applesauce, and gelatin, as you feel ready.  Slowly move to solid foods. Don t eat fatty, rich, or spicy foods at first.  Don't force yourself to have 3 large meals a day. Instead eat smaller amounts more often.  Take pain medicines with a small amount of solid food, such as crackers or toast, to prevent nausea.    If you have obstructive sleep apnea    You were given anesthesia medicine during surgery to keep you comfortable and free of pain. After surgery, you may have more apnea spells because of this medicine and other medicines you were given. The spells may last longer than usual.   At home:    Keep  using the continuous positive airway pressure (CPAP) device when you sleep. Unless your healthcare provider tells you not to, use it when you sleep, day or night. CPAP is a common device used to treat obstructive sleep apnea.  Talk with your provider before taking any pain medicine, muscle relaxants, or sedatives. Your provider will tell you about the possible dangers of taking these medicines.

## 2022-05-27 NOTE — ANESTHESIA POSTPROCEDURE EVALUATION
Patient: Camilla Johnson    Procedure: Procedure(s):  Left Breast Biopsy after Wire Localization       Anesthesia Type:  MAC    Note:  Disposition: Outpatient   Postop Pain Control: Uneventful            Sign Out: Well controlled pain   PONV: No   Neuro/Psych: Uneventful            Sign Out: Acceptable/Baseline neuro status   Airway/Respiratory: Uneventful            Sign Out: Acceptable/Baseline resp. status   CV/Hemodynamics: Uneventful            Sign Out: Acceptable CV status; No obvious hypovolemia; No obvious fluid overload   Other NRE: NONE   DID A NON-ROUTINE EVENT OCCUR? No           Last vitals:  Vitals Value Taken Time   /67 05/27/22 1200   Temp 97.3  F (36.3  C) 05/27/22 1200   Pulse 56 05/27/22 1216   Resp 16 05/27/22 1200   SpO2 100 % 05/27/22 1216   Vitals shown include unvalidated device data.    Electronically Signed By: Meet Tucker MD  May 27, 2022  1:30 PM

## 2022-05-27 NOTE — ANESTHESIA PREPROCEDURE EVALUATION
Anesthesia Pre-Procedure Evaluation    Patient: Camilla Johnson   MRN: [id number] : 1975        Procedure : Procedure(s):  Left Breast Biopsy after Wire Localization          History reviewed. No pertinent past medical history.   Past Surgical History:   Procedure Laterality Date     BREAST EXCISIONAL BIOPSY Left     benign     BREAST EXCISIONAL BIOPSY Left       SECTION      x2      No Known Allergies   Social History     Tobacco Use     Smoking status: Never Smoker     Smokeless tobacco: Never Used   Substance Use Topics     Alcohol use: No      Wt Readings from Last 1 Encounters:   22 47.6 kg (105 lb)        Anesthesia Evaluation   Pt has had prior anesthetic.     No history of anesthetic complications       ROS/MED HX  ENT/Pulmonary:  - neg pulmonary ROS     Neurologic:  - neg neurologic ROS     Cardiovascular:  - neg cardiovascular ROS     METS/Exercise Tolerance:     Hematologic:  - neg hematologic  ROS     Musculoskeletal:  - neg musculoskeletal ROS     GI/Hepatic:  - neg GI/hepatic ROS     Renal/Genitourinary:  - neg Renal ROS     Endo:  - neg endo ROS     Psychiatric/Substance Use:  - neg psychiatric ROS     Infectious Disease:  - neg infectious disease ROS     Malignancy:  - neg malignancy ROS     Other:  - neg other ROS          Physical Exam    Airway  airway exam normal      Mallampati: II   TM distance: > 3 FB   Neck ROM: full   Mouth opening: > 3 cm    Respiratory Devices and Support         Dental  no notable dental history         Cardiovascular   cardiovascular exam normal       Rhythm and rate: regular and normal     Pulmonary   pulmonary exam normal        breath sounds clear to auscultation           OUTSIDE LABS:  CBC:   Lab Results   Component Value Date    WBC 6.2 2019    HGB 11.3 (L) 2019    HCT 33.5 (L) 2019     2019     BMP: No results found for: NA, POTASSIUM, CHLORIDE, CO2, BUN, CR, GLC  COAGS: No results found for: PTT, INR,  FIBR  POC: No results found for: BGM, HCG, HCGS  HEPATIC:   Lab Results   Component Value Date    ALBUMIN 3.4 (L) 05/30/2019    PROTTOTAL 6.5 05/30/2019    ALT 16 05/30/2019    AST 15 05/30/2019    ALKPHOS 57 05/30/2019    BILITOTAL 0.5 05/30/2019     OTHER: No results found for: PH, LACT, A1C, TRISH, PHOS, MAG, LIPASE, AMYLASE, TSH, T4, T3, CRP, SED    Anesthesia Plan    ASA Status:  1   NPO Status:  NPO Appropriate    Anesthesia Type: MAC.     - Reason for MAC: straight local not clinically adequate, immobility needed   Induction: Intravenous, Propofol.   Maintenance: TIVA.        Consents    Anesthesia Plan(s) and associated risks, benefits, and realistic alternatives discussed. Questions answered and patient/representative(s) expressed understanding.    - Discussed:     - Discussed with:  Patient         Postoperative Care    Pain management: Multi-modal analgesia.   PONV prophylaxis: Ondansetron (or other 5HT-3), Dexamethasone or Solumedrol     Comments:    Other Comments: MAC - propofol gtt, versed/fentanyl/ketamine bolus PRN for pain  Decadron/Zofran for antiemesis  Convert to General with LMA if unable to maintain adequate SpO2 on reduced FiO2 (<30%)  Fire precautions  Reviewed anesthetic options and risks. Patient agrees to proceed.     Recent Results (from the past 120 hour(s))  -Asymptomatic COVID-19 Virus (Coronavirus) by PCR Nose:   Collection Time: 05/23/22  8:14 AM  Specimen: Nose; Swab       Result                                            Value                         Ref Range                       SARS CoV2 PCR                                     Negative                      Negative, Testing sent t*            Meet Tucker MD

## 2022-05-27 NOTE — INTERVAL H&P NOTE
"I have reviewed the surgical (or preoperative) H&P that is linked to this encounter, and examined the patient. There are no significant changes    Clinical Conditions Present on Arrival:  Clinically Significant Risk Factors Present on Admission                   # Overweight: Estimated body mass index is 25.32 kg/m  as calculated from the following:    Height as of this encounter: 1.372 m (4' 6\").    Weight as of this encounter: 47.6 kg (105 lb).       "

## 2022-05-27 NOTE — OP NOTE
Operative Note    Name:  Camilla Johnson  PCP:  Camilla Hope  Procedure Date:  5/27/2022       Procedure:  Procedure(s):  Left Breast Biopsy after Wire Localization     Pre-Procedure Diagnosis:  Papilloma of left breast [D24.2]     Post-Procedure Diagnosis:    Same     Surgeon(s):  Kaley Peck MD     Assistant: None      Anesthesia Type:  Monitor Anesthesia Care       Findings:  Clip in specimen.    Operative Report:    The patient was brought to the operating suite where she was placed in the supine position.  She was prepped and draped in a sterile fashion.  After infiltration with 1% lidocaine and quarter percent Marcaine  a curvilinear  incision was made adjacent to the wire in the lateral  left breast .  This is carried down to the crimp in the wire and the widely around the end of the wire  with electrocautery. It was removed marked and sent  to radiology and then pathology. The wound was inspected for hemostasis and closed with 3-0 Vicryl to the subcutaneous tissues and a subcuticular suture for Monocryl to the skin.  Dressings were placed.  The patient procedure well.    Estimated Blood Loss:   5cc    Specimens:    ID Type Source Tests Collected by Time Destination   1 :  Biopsy Breast, Left SURGICAL PATHOLOGY EXAM Kaley Peck MD 5/27/2022 11:32 AM            Drains:        Complications:    None    Kaley Peck MD     Date: 5/27/2022  Time: 11:45 AM

## 2022-05-31 ENCOUNTER — TELEPHONE (OUTPATIENT)
Dept: SURGERY | Facility: CLINIC | Age: 47
End: 2022-05-31
Payer: COMMERCIAL

## 2022-05-31 NOTE — TELEPHONE ENCOUNTER
Patient called, said Dr. Peck told her she could cancel her appointment with her for a post op on 6-3-22 as long as she is doing well and not having any issues.  Patient said she is doing great and healing well.  Appointment cancelled.

## 2023-05-05 ENCOUNTER — ANCILLARY PROCEDURE (OUTPATIENT)
Dept: MAMMOGRAPHY | Facility: CLINIC | Age: 48
End: 2023-05-05
Attending: FAMILY MEDICINE
Payer: COMMERCIAL

## 2023-05-05 DIAGNOSIS — Z12.31 VISIT FOR SCREENING MAMMOGRAM: ICD-10-CM

## 2023-05-05 PROCEDURE — 77067 SCR MAMMO BI INCL CAD: CPT

## 2024-05-06 ENCOUNTER — ANCILLARY PROCEDURE (OUTPATIENT)
Dept: MAMMOGRAPHY | Facility: CLINIC | Age: 49
End: 2024-05-06
Attending: PHYSICIAN ASSISTANT
Payer: COMMERCIAL

## 2024-05-06 DIAGNOSIS — Z12.31 VISIT FOR SCREENING MAMMOGRAM: ICD-10-CM

## 2024-05-06 PROCEDURE — 77063 BREAST TOMOSYNTHESIS BI: CPT

## 2025-04-01 ENCOUNTER — ANCILLARY PROCEDURE (OUTPATIENT)
Dept: MAMMOGRAPHY | Facility: CLINIC | Age: 50
End: 2025-04-01
Attending: PHYSICIAN ASSISTANT
Payer: COMMERCIAL

## 2025-04-01 DIAGNOSIS — Z12.31 BREAST CANCER SCREENING BY MAMMOGRAM: ICD-10-CM

## 2025-04-01 PROCEDURE — 77063 BREAST TOMOSYNTHESIS BI: CPT
